# Patient Record
Sex: FEMALE | URBAN - METROPOLITAN AREA
[De-identification: names, ages, dates, MRNs, and addresses within clinical notes are randomized per-mention and may not be internally consistent; named-entity substitution may affect disease eponyms.]

---

## 2021-12-15 ENCOUNTER — APPOINTMENT (RX ONLY)
Dept: URBAN - METROPOLITAN AREA CLINIC 259 | Facility: CLINIC | Age: 36
Setting detail: DERMATOLOGY
End: 2021-12-15

## 2021-12-15 DIAGNOSIS — L50.1 IDIOPATHIC URTICARIA: ICD-10-CM | Status: INADEQUATELY CONTROLLED

## 2021-12-15 PROCEDURE — ? ADDITIONAL NOTES

## 2021-12-15 PROCEDURE — ? COUNSELING

## 2021-12-15 PROCEDURE — ? MEDICATION COUNSELING

## 2021-12-15 PROCEDURE — ? PRESCRIPTION MEDICATION MANAGEMENT

## 2021-12-15 PROCEDURE — 99204 OFFICE O/P NEW MOD 45 MIN: CPT

## 2021-12-15 PROCEDURE — ? PRESCRIPTION

## 2021-12-15 PROCEDURE — ? XOLAIR INITIATION

## 2021-12-15 RX ORDER — TRIAMCINOLONE ACETONIDE 1 MG/G
OINTMENT TOPICAL BID
Qty: 30 | Refills: 0 | Status: ERX | COMMUNITY
Start: 2021-12-15

## 2021-12-15 RX ORDER — MONTELUKAST SODIUM 10 MG/1
TABLET, FILM COATED ORAL QAM
Qty: 30 | Refills: 2 | Status: ERX | COMMUNITY
Start: 2021-12-15

## 2021-12-15 RX ADMIN — MONTELUKAST SODIUM: 10 TABLET, FILM COATED ORAL at 00:00

## 2021-12-15 RX ADMIN — TRIAMCINOLONE ACETONIDE: 1 OINTMENT TOPICAL at 00:00

## 2021-12-15 NOTE — PROCEDURE: ADDITIONAL NOTES
Additional Notes: Patient consent was obtained to proceed with the visit and recommended plan of care after discussion of all risks and benefits, including the risks of COVID-19 exposure.
Detail Level: Simple
Render Risk Assessment In Note?: no
Additional Notes: Patient did Xolair in 2019 well tolerated and worked well. Discussed it might be FDE and not urticaria. Declines recent angioedema but reports past history. Advised to proceed to to ED for angioedema risk of airway closing discussed. Advised to stop NSAIDS and acetaminophen until we can rule out FDE. Will monitor for urticaria spreading.

## 2021-12-15 NOTE — PROCEDURE: XOLAIR INITIATION
Is Cyclosporine Contraindicated?: No
Diagnosis (Required): Chronic Urticaria
Xolair Dosing: 300mg SC every 4 weeks
Xolair Monitoring Guidelines: The patient should be monitored during dosing for anaphylaxis.
Detail Level: Zone
Pregnancy And Lactation Warning Text: This medication is Pregnancy Category B and is considered safe during pregnancy. This medication is excreted in breast milk.

## 2021-12-15 NOTE — PROCEDURE: PRESCRIPTION MEDICATION MANAGEMENT
Detail Level: Zone
Initiate Treatment: Xolair once treatment below failed will start \\nSingular QAM \\nAllegra QHS \\nPepcid at lunch\\nAllegra QHS\\ntriamcinolone.1% cream BID for 2 weeks.
Render In Strict Bullet Format?: No

## 2022-02-17 ENCOUNTER — APPOINTMENT (RX ONLY)
Dept: URBAN - METROPOLITAN AREA CLINIC 203 | Facility: CLINIC | Age: 37
Setting detail: DERMATOLOGY
End: 2022-02-17

## 2022-02-17 ENCOUNTER — RX ONLY (OUTPATIENT)
Age: 37
Setting detail: RX ONLY
End: 2022-02-17

## 2022-02-17 DIAGNOSIS — L50.1 IDIOPATHIC URTICARIA: ICD-10-CM | Status: INADEQUATELY CONTROLLED

## 2022-02-17 PROCEDURE — ? PRESCRIPTION

## 2022-02-17 PROCEDURE — ? XOLAIR INITIATION

## 2022-02-17 PROCEDURE — 99214 OFFICE O/P EST MOD 30 MIN: CPT

## 2022-02-17 PROCEDURE — ? ADDITIONAL NOTES

## 2022-02-17 PROCEDURE — ? PRESCRIPTION MEDICATION MANAGEMENT

## 2022-02-17 PROCEDURE — ? TREATMENT REGIMEN

## 2022-02-17 PROCEDURE — ? COUNSELING

## 2022-02-17 PROCEDURE — ? MEDICATION COUNSELING

## 2022-02-17 RX ORDER — AMMONIUM LACTATE 12 G/100G
12% LOTION TOPICAL
Qty: 400 | Refills: 0 | Status: ERX | COMMUNITY
Start: 2022-02-17

## 2022-02-17 RX ORDER — TRIAMCINOLONE ACETONIDE 1 MG/G
0.1% OINTMENT TOPICAL BID
Qty: 454 | Refills: 0 | Status: ERX

## 2022-02-17 RX ORDER — TRIAMCINOLONE ACETONIDE 1 MG/G
0.1% OINTMENT TOPICAL
Qty: 454 | Refills: 0 | Status: ERX

## 2022-02-17 RX ADMIN — AMMONIUM LACTATE 12%: 12 LOTION TOPICAL at 00:00

## 2022-02-17 NOTE — PROCEDURE: PRESCRIPTION MEDICATION MANAGEMENT
Continue Regimen: Singular QAM \\nAllegra QHS\\ntriamcinolone.1% cream BID for 2 weeks.
Detail Level: Zone
Initiate Treatment: CeraVe moisturizer and Lac-Hydrin twice daily
Render In Strict Bullet Format?: No